# Patient Record
Sex: FEMALE | Race: WHITE | NOT HISPANIC OR LATINO | URBAN - METROPOLITAN AREA
[De-identification: names, ages, dates, MRNs, and addresses within clinical notes are randomized per-mention and may not be internally consistent; named-entity substitution may affect disease eponyms.]

---

## 2017-01-31 ENCOUNTER — EMERGENCY (EMERGENCY)
Facility: HOSPITAL | Age: 22
LOS: 1 days | Discharge: HOME | End: 2017-01-31
Attending: EMERGENCY MEDICINE | Admitting: EMERGENCY MEDICINE
Payer: COMMERCIAL

## 2017-01-31 VITALS
OXYGEN SATURATION: 97 % | HEIGHT: 61 IN | DIASTOLIC BLOOD PRESSURE: 67 MMHG | TEMPERATURE: 97 F | HEART RATE: 72 BPM | SYSTOLIC BLOOD PRESSURE: 104 MMHG | WEIGHT: 106.92 LBS | RESPIRATION RATE: 20 BRPM

## 2017-01-31 VITALS
RESPIRATION RATE: 20 BRPM | TEMPERATURE: 98 F | OXYGEN SATURATION: 99 % | HEART RATE: 62 BPM | SYSTOLIC BLOOD PRESSURE: 98 MMHG | DIASTOLIC BLOOD PRESSURE: 64 MMHG

## 2017-01-31 DIAGNOSIS — R10.84 GENERALIZED ABDOMINAL PAIN: ICD-10-CM

## 2017-01-31 DIAGNOSIS — F41.9 ANXIETY DISORDER, UNSPECIFIED: ICD-10-CM

## 2017-01-31 DIAGNOSIS — R11.0 NAUSEA: ICD-10-CM

## 2017-01-31 LAB
APPEARANCE UR: CLEAR — SIGNIFICANT CHANGE UP
BILIRUB UR-MCNC: NEGATIVE — SIGNIFICANT CHANGE UP
COLOR SPEC: YELLOW — SIGNIFICANT CHANGE UP
DIFF PNL FLD: NEGATIVE — SIGNIFICANT CHANGE UP
GLUCOSE UR QL: NEGATIVE — SIGNIFICANT CHANGE UP
KETONES UR-MCNC: NEGATIVE — SIGNIFICANT CHANGE UP
LACTATE SERPL-SCNC: 1.5 MMOL/L — SIGNIFICANT CHANGE UP (ref 0.5–2)
LACTATE SERPL-SCNC: 2.1 MMOL/L — HIGH (ref 0.5–2)
LEUKOCYTE ESTERASE UR-ACNC: NEGATIVE — SIGNIFICANT CHANGE UP
NITRITE UR-MCNC: NEGATIVE — SIGNIFICANT CHANGE UP
PH UR: 7 — SIGNIFICANT CHANGE UP (ref 4–8)
PROT UR-MCNC: NEGATIVE MG/DL — SIGNIFICANT CHANGE UP
SP GR SPEC: 1.01 — SIGNIFICANT CHANGE UP (ref 1–1.03)
UROBILINOGEN FLD QL: 0.2 E.U./DL — SIGNIFICANT CHANGE UP

## 2017-01-31 PROCEDURE — 87086 URINE CULTURE/COLONY COUNT: CPT

## 2017-01-31 PROCEDURE — 80053 COMPREHEN METABOLIC PANEL: CPT

## 2017-01-31 PROCEDURE — 81003 URINALYSIS AUTO W/O SCOPE: CPT

## 2017-01-31 PROCEDURE — 96374 THER/PROPH/DIAG INJ IV PUSH: CPT

## 2017-01-31 PROCEDURE — 83605 ASSAY OF LACTIC ACID: CPT

## 2017-01-31 PROCEDURE — 83735 ASSAY OF MAGNESIUM: CPT

## 2017-01-31 PROCEDURE — 36415 COLL VENOUS BLD VENIPUNCTURE: CPT

## 2017-01-31 PROCEDURE — 99284 EMERGENCY DEPT VISIT MOD MDM: CPT | Mod: 25

## 2017-01-31 PROCEDURE — 85025 COMPLETE CBC W/AUTO DIFF WBC: CPT

## 2017-01-31 PROCEDURE — 84702 CHORIONIC GONADOTROPIN TEST: CPT

## 2017-01-31 PROCEDURE — 96375 TX/PRO/DX INJ NEW DRUG ADDON: CPT

## 2017-01-31 PROCEDURE — 83690 ASSAY OF LIPASE: CPT

## 2017-01-31 RX ORDER — MORPHINE SULFATE 50 MG/1
4 CAPSULE, EXTENDED RELEASE ORAL ONCE
Qty: 0 | Refills: 0 | Status: DISCONTINUED | OUTPATIENT
Start: 2017-01-31 | End: 2017-01-31

## 2017-01-31 RX ORDER — DIATRIZOATE MEGLUMINE 180 MG/ML
30 INJECTION, SOLUTION INTRAVESICAL ONCE
Qty: 0 | Refills: 0 | Status: DISCONTINUED | OUTPATIENT
Start: 2017-01-31 | End: 2017-01-31

## 2017-01-31 RX ORDER — SODIUM CHLORIDE 9 MG/ML
1500 INJECTION INTRAMUSCULAR; INTRAVENOUS; SUBCUTANEOUS ONCE
Qty: 0 | Refills: 0 | Status: COMPLETED | OUTPATIENT
Start: 2017-01-31 | End: 2017-01-31

## 2017-01-31 RX ORDER — IOHEXOL 300 MG/ML
50 INJECTION, SOLUTION INTRAVENOUS ONCE
Qty: 0 | Refills: 0 | Status: COMPLETED | OUTPATIENT
Start: 2017-01-31 | End: 2017-01-31

## 2017-01-31 RX ORDER — ONDANSETRON 8 MG/1
4 TABLET, FILM COATED ORAL ONCE
Qty: 0 | Refills: 0 | Status: COMPLETED | OUTPATIENT
Start: 2017-01-31 | End: 2017-01-31

## 2017-01-31 RX ORDER — SODIUM CHLORIDE 9 MG/ML
1000 INJECTION INTRAMUSCULAR; INTRAVENOUS; SUBCUTANEOUS ONCE
Qty: 0 | Refills: 0 | Status: COMPLETED | OUTPATIENT
Start: 2017-01-31 | End: 2017-01-31

## 2017-01-31 RX ADMIN — SODIUM CHLORIDE 1000 MILLILITER(S): 9 INJECTION INTRAMUSCULAR; INTRAVENOUS; SUBCUTANEOUS at 06:17

## 2017-01-31 RX ADMIN — IOHEXOL 50 MILLILITER(S): 300 INJECTION, SOLUTION INTRAVENOUS at 05:55

## 2017-01-31 RX ADMIN — SODIUM CHLORIDE 1500 MILLILITER(S): 9 INJECTION INTRAMUSCULAR; INTRAVENOUS; SUBCUTANEOUS at 05:31

## 2017-01-31 RX ADMIN — MORPHINE SULFATE 4 MILLIGRAM(S): 50 CAPSULE, EXTENDED RELEASE ORAL at 05:48

## 2017-01-31 RX ADMIN — MORPHINE SULFATE 4 MILLIGRAM(S): 50 CAPSULE, EXTENDED RELEASE ORAL at 05:31

## 2017-01-31 RX ADMIN — ONDANSETRON 4 MILLIGRAM(S): 8 TABLET, FILM COATED ORAL at 05:31

## 2017-01-31 RX ADMIN — Medication 0.5 MILLIGRAM(S): at 05:48

## 2017-01-31 NOTE — ED PROVIDER NOTE - OBJECTIVE STATEMENT
patient reports awaking with severe nausea and after which abdominal pain with attempts to self induce emesis unsuccessful and feeling unwell came to ED Patient reports Hx gaseous discomfort in past but different and as well admits to being anxious and asks nursing and myself iif having an anxiety attack though does not usually present as such

## 2017-01-31 NOTE — ED PROVIDER NOTE - PHYSICAL EXAMINATION
patient hyperventilating and hyper mobile upon arrival with intermittent bouts of full calm and in fact laughter when speaking with others on the phone

## 2017-01-31 NOTE — ED PROVIDER NOTE - MEDICAL DECISION MAKING DETAILS
difficult to amalgamate patients constellation of symptoms with respect to clear pnic attack occuring ( calmed over time in Ed ) but as to whether abdominal surgical pain spurring anxiety exacerbation  exists serial exams labs and discussed CT exam with patient difficult to amalgamate patients constellation of symptoms with respect to clear pnic attack occuring ( calmed over time in Ed ) but as to whether abdominal surgical pain spurring anxiety exacerbation  exists serial exams labs and discussed CT exam with patient--Serial exams note gross improvement and full abatement of bboth anxiety and pain + d/c home and return parameters discussed

## 2017-01-31 NOTE — ED PROVIDER NOTE - ATTENDING CONTRIBUTION TO CARE
patient reports awaking with severe nausea and after which abdominal pain with attempts to self induce emesis unsuccessful and feeling unwell came to ED. Pt very anxious in ED, but describes as "gas" pain and has had in past. Examined multiple times in ED, never tender on exam. labs wnl, normal VS. Pt after being seen, relaxed, and seemed much more comfortable. Dc home with plan to return immediately for new or worsening symptoms. Did not meet threshold to image at this time.

## 2017-01-31 NOTE — ED ADULT NURSE NOTE - OBJECTIVE STATEMENT
22 y/o female presenting to ER c/o lower abdominal pain this morning, abdomen soft, bowel sounds present to all quadrants, Iv line started, blood work done and sent to lab, awaiting results. Will continue to monitor.

## 2019-05-06 ENCOUNTER — EMERGENCY (EMERGENCY)
Facility: HOSPITAL | Age: 24
LOS: 1 days | Discharge: HOME | End: 2019-05-06
Admitting: EMERGENCY MEDICINE
Payer: COMMERCIAL

## 2019-05-06 VITALS
HEART RATE: 84 BPM | SYSTOLIC BLOOD PRESSURE: 120 MMHG | RESPIRATION RATE: 18 BRPM | DIASTOLIC BLOOD PRESSURE: 86 MMHG | OXYGEN SATURATION: 99 % | TEMPERATURE: 98 F

## 2019-05-06 DIAGNOSIS — M79.662 PAIN IN LEFT LOWER LEG: ICD-10-CM

## 2019-05-06 DIAGNOSIS — M25.562 PAIN IN LEFT KNEE: ICD-10-CM

## 2019-05-06 DIAGNOSIS — M79.605 PAIN IN LEFT LEG: ICD-10-CM

## 2019-05-06 PROCEDURE — 93971 EXTREMITY STUDY: CPT | Mod: 26,LT

## 2019-05-06 PROCEDURE — 99284 EMERGENCY DEPT VISIT MOD MDM: CPT

## 2019-05-06 NOTE — ED ADULT NURSE NOTE - OBJECTIVE STATEMENT
patient presents to the Ed with complaints of left calf pain that began just prior to arrival and states she wanted to be checked for a blood clot  endorses prolonged periods of sitting because of "studying"   denies chest pain sob or dizziness

## 2019-05-06 NOTE — ED PROVIDER NOTE - CARE PROVIDER_API CALL
[place])  Orthopaedic Surgery  [address] [place] [address]  Phone: [phone]  Fax: [phone]  Follow Up Time:

## 2019-05-06 NOTE — ED PROVIDER NOTE - PHYSICAL EXAMINATION
CONSTITUTIONAL: Well-developed; well-nourished; in no acute distress.  	SKIN: Skin is warm and dry, no acute rash.  	HEAD: Normocephalic; atraumatic.  	EYES: clear bilaterally  	ENT: airway patent  	NECK: Supple; non tender.  	CARD: S1, S2 normal; no murmurs, gallops, or rubs. Regular rate and rhythm.  	RESP: No wheezes, rales or rhonchi.  	LLE: normal inspection, nontender, full ROM joints, no calf swelling or tenderness. soft compartments. distal pulses intact. no sensory deficits or motor deficits. ambulatory   	NEURO: Alert, oriented. Grossly unremarkable.  PSYCH: Cooperative, appropriate.

## 2019-05-06 NOTE — ED PROVIDER NOTE - OBJECTIVE STATEMENT
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 no hx PE or DVT in the past. no fever, no chills, no numbness, n oweakness or back pain. ambulatory in ED. no recent travel.

## 2019-05-06 NOTE — ED ADULT NURSE NOTE - NSIMPLEMENTINTERV_GEN_ALL_ED
Implemented All Universal Safety Interventions:  [place] to call system. Call bell, personal items and telephone within reach. Instruct patient to call for assistance. Room bathroom lighting operational. Non-slip footwear when patient is off stretcher. Physically safe environment: no spills, clutter or unnecessary equipment. Stretcher in lowest position, wheels locked, appropriate side rails in place.

## 2019-05-06 NOTE — ED ADULT TRIAGE NOTE - CHIEF COMPLAINT QUOTE
Pt complaining of left leg pain behind the knee. Pt states " I am concerned that I have a blood clot. I have been sitting for long periods of time studying and I noticed that it is red behind my knee. I am also on hormonal birth control." Pt denies recent travel. Pt denies sob, chest pain, nausea, and vomiting.

## 2019-05-06 NOTE — ED PROVIDER NOTE - NSFOLLOWUPINSTRUCTIONS_ED_ALL_ED_FT
Take Ibuprofen 600mg every 6-8 hours as needed for pain, take with food, and in addition you may take Tylenol 500 mg every 6-8 hours as needed for pain  Rest. Cool compresses.  Extremity elevation.  Ace wrap    Your ultrasound was negative for blood clots    If persisting pain, please follow up with Orthopedics    Return to the Emergency Department for worsening pain ,swelling, redness, fever, inability to walk or any concerns.

## 2024-01-10 NOTE — ED PROVIDER NOTE - CPE EDP RESP NORM
----- Message from Jolynn Trinidad NP sent at 1/9/2024  4:39 PM CST -----  Regarding: pre op  Toyin, please emily in pulmonary for Pre op visit with feno. She is scheduled for breast reduction surgery on 1/24/24. Thank you    ----- Message -----  From: Toyin Cross MA  Sent: 1/9/2024   3:20 PM CST  To: CHANTAL [address] Dr need to know if you can clear pt for surgery just seen pt 12/22/2023 with a mark  ----- Message -----  From: Vivi Chand MD  Sent: 1/9/2024   2:48 PM CST  To: [facility] Staff    Needs pulm clearance for upcoming breast reduction; please advise if patient needs to be seen again or if last visit can be addended. Thank you      
- - -

## 2024-04-05 NOTE — ED ADULT TRIAGE NOTE - AS O2 DELIVERY
Refill Routing Note   Medication(s) are not appropriate for processing by [facility] for the following reason(s):        Outside of protocol  Clarification of medication (Rx) details    [facility] action(s):  Route  Defer  Approve    Medication Therapy Plan: Albuterol and Stiolto previously prescribed by you without refills. Benzonatate outside of [facility] protocol.    Appointments  past 12m or future 3m with PCP    Date Provider   Last Visit   3/14/2024 Jak Choudhary MD   Next Visit   9/16/2024 Jak Choudhary MD   ED visits in past 90 days: 0        Note composed:3:07 PM 04/05/2024           room air

## 2025-03-20 NOTE — ED ADULT NURSE NOTE - DISCHARGE DATE/TIME
1) [facility] 3 months w/ labs (CMP, PSA) and Zometa.  2) [facility] 6 months w/ labs (CBC, CMP, PSA, vitamin D) and Lupron / Zometa.    Eduardo Holliday MD.  
- - -
31-Jan-2017 07:20